# Patient Record
(demographics unavailable — no encounter records)

---

## 2025-02-19 NOTE — HISTORY OF PRESENT ILLNESS
[FreeTextEntry1] : physical [de-identified] : Patient comes for an annual exam.  He saw GI in [place] for intermittent constipation. Had blood work (CBC, CMP, celiac panel) done 2 weeks ago, results negative. He is taking daily probiotic and fiber supplement.  He complains of intermittent transient blurry vision usually lasting 30 seconds, occurs 2-3x a week. Affects both eyes. Occurs randomly, no specific triggers. No diplopia. Has intermittent headaches. No weakness.  10-Mar-2020

## 2025-02-19 NOTE — HISTORY OF PRESENT ILLNESS
[FreeTextEntry1] : physical [de-identified] : Patient comes for an annual exam.  He saw GI in [place] for intermittent constipation. Had blood work (CBC, CMP, celiac panel) done 2 weeks ago, results negative. He is taking daily probiotic and fiber supplement.  He complains of intermittent transient blurry vision usually lasting 30 seconds, occurs 2-3x a week. Affects both eyes. Occurs randomly, no specific triggers. No diplopia. Has intermittent headaches. No weakness.

## 2025-02-19 NOTE — HEALTH RISK ASSESSMENT
[Excellent] : ~his/her~  mood as  excellent [No] : In the past 12 months have you used drugs other than those required for medical reasons? No [No falls in past year] : Patient reported no falls in the past year [0] : 2) Feeling down, depressed, or hopeless: Not at all (0) [PHQ-2 Negative - No further assessment needed] : PHQ-2 Negative - No further assessment needed [Never] : Never [None] : None [With Family] : lives with family [Employed] : employed [College] : College [# Of Children ___] : has [unfilled] children [Sexually Active] : sexually active [Smoke Detector] : smoke detector [Carbon Monoxide Detector] : carbon monoxide detector [Seat Belt] :  uses seat belt [Sunscreen] : uses sunscreen [Significant Other] : lives with significant other [de-identified] : volleyball, soccer, golf, [employer]  [[id number]] : 0 [Change in mental status noted] : No change in mental status noted [High Risk Behavior] : no high risk behavior [Reports changes in hearing] : Reports no changes in hearing [Reports changes in vision] : Reports no changes in vision [Reports changes in dental health] : Reports no changes in dental health

## 2025-02-19 NOTE — HEALTH RISK ASSESSMENT
[Excellent] : ~his/her~  mood as  excellent [No] : In the past 12 months have you used drugs other than those required for medical reasons? No [No falls in past year] : Patient reported no falls in the past year [0] : 2) Feeling down, depressed, or hopeless: Not at all (0) [PHQ-2 Negative - No further assessment needed] : PHQ-2 Negative - No further assessment needed [Never] : Never [None] : None [With Family] : lives with family [Employed] : employed [College] : College [# Of Children ___] : has [unfilled] children [Sexually Active] : sexually active [Smoke Detector] : smoke detector [Carbon Monoxide Detector] : carbon monoxide detector [Seat Belt] :  uses seat belt [Sunscreen] : uses sunscreen [Significant Other] : lives with significant other [de-identified] : volleyball, soccer, golf, [employer]  [[id number]] : 0 [Change in mental status noted] : No change in mental status noted [High Risk Behavior] : no high risk behavior [Reports changes in hearing] : Reports no changes in hearing [Reports changes in vision] : Reports no changes in vision [Reports changes in dental health] : Reports no changes in dental health

## 2025-02-19 NOTE — PHYSICAL EXAM
[No Lymphadenopathy] : no lymphadenopathy [Supple] : supple [Thyroid Normal, No Nodules] : the thyroid was normal and there were no nodules present [Normal] : normal rate, regular rhythm, normal S1 and S2 and no murmur heard [Pedal Pulses Present] : the pedal pulses are present [No Edema] : there was no peripheral edema [Soft] : abdomen soft [Non Tender] : non-tender [Non-distended] : non-distended [Normal Bowel Sounds] : normal bowel sounds [No CVA Tenderness] : no CVA  tenderness [No Spinal Tenderness] : no spinal tenderness [No Joint Swelling] : no joint swelling [No Rash] : no rash [Coordination Grossly Intact] : coordination grossly intact [No Focal Deficits] : no focal deficits [Normal Gait] : normal gait [Normal Affect] : the affect was normal [Normal Mood] : the mood was normal [de-identified] : friendly healthy male

## 2025-02-19 NOTE — PLAN
[FreeTextEntry1] : Check routine labs. Not fasting so provided lab slip.  Discussed diet, exercise, and weight maintenance. BMI normal. Flu shot in the Fall. Sexually active with female partner. No need for STD testing. Blurry vision / headaches - send for MRI brain r/o demyelinating disorder. Referred to ophthalmology. Check labs.  RTO in 1 yr for annual exam or earlier PRN for acute care.

## 2025-02-19 NOTE — REVIEW OF SYSTEMS
[Negative] : Heme/Lymph [Vision Problems] : vision problems [Headache] : headache [Dizziness] : no dizziness [Memory Loss] : no memory loss